# Patient Record
Sex: FEMALE | Race: WHITE | NOT HISPANIC OR LATINO | URBAN - METROPOLITAN AREA
[De-identification: names, ages, dates, MRNs, and addresses within clinical notes are randomized per-mention and may not be internally consistent; named-entity substitution may affect disease eponyms.]

---

## 2017-09-05 NOTE — PATIENT DISCUSSION
PATIENT WAS ADVISED OF THE INCREASED RISK OF NIGHT VISION SYMPTOMS ASSOCIATED WITH LARGER PUPILS. I DISCUSSED IN DETAIL THE POSSIBILITY OF GLARE, HALOS, STEAMERS AROUND LIGHTS AND STARBURSTS AT NIGHT THAT MAY AFFECT VISION FOR WORK. DETAILS OF THE STUDY PERFORMED BY THE  AND FDA TO DETERMINE PROBABILITY OF GLARE AS A RESULT OF HAVING LASIK WAS REVIEWED WITH THE PATIENT. PATIENT WAS ADVISED THAT EYE DROPS AND/OR MAY HELP TO DECREASE SOME OF THESE SYMPTOMS BUT THAT THERE IS NO GUARANTEE.

## 2017-09-07 NOTE — PATIENT DISCUSSION
PT UNDERSTANDS OPTIONS AND DESIRES TO PROCEED WITH LASIK TO IMPROVE VA AND REDUCE DEPENDENCY ON GLS/CTLS.

## 2017-09-07 NOTE — PATIENT DISCUSSION
KDS DISC  RISK OF INCREASED NIGHT VISION SYMPTOMS DUE TO LARGE PUPILS AND THESE SYMPTOMS MAY PERSIST LONG TERM.

## 2021-12-30 ENCOUNTER — NEW PATIENT (OUTPATIENT)
Dept: URBAN - METROPOLITAN AREA CLINIC 4 | Facility: CLINIC | Age: 41
End: 2021-12-30

## 2021-12-30 DIAGNOSIS — H11.153: ICD-10-CM

## 2021-12-30 DIAGNOSIS — H40.1131: ICD-10-CM

## 2021-12-30 PROCEDURE — 92020 GONIOSCOPY: CPT

## 2021-12-30 PROCEDURE — 92015 DETERMINE REFRACTIVE STATE: CPT

## 2021-12-30 PROCEDURE — 92133 CPTRZD OPH DX IMG PST SGM ON: CPT

## 2021-12-30 PROCEDURE — 92004 COMPRE OPH EXAM NEW PT 1/>: CPT

## 2021-12-30 ASSESSMENT — KERATOMETRY
OD_AXISANGLE_DEGREES: 27
OS_K2POWER_DIOPTERS: 47.00
OD_AXISANGLE2_DEGREES: 117
OD_K2POWER_DIOPTERS: 47.25
OS_AXISANGLE2_DEGREES: 99
OS_AXISANGLE_DEGREES: 009
OS_K1POWER_DIOPTERS: 46.25
OD_K1POWER_DIOPTERS: 46.50

## 2021-12-30 ASSESSMENT — VISUAL ACUITY
OS_SC: 20/20-1
OU_SC: 20/20-1
OS_GLARE: 20/50
OD_SC: 20/40-2
OD_GLARE: 20/50

## 2021-12-30 ASSESSMENT — TONOMETRY
OD_IOP_MMHG: 18
OS_IOP_MMHG: 17